# Patient Record
Sex: FEMALE | Race: WHITE | Employment: FULL TIME | URBAN - METROPOLITAN AREA
[De-identification: names, ages, dates, MRNs, and addresses within clinical notes are randomized per-mention and may not be internally consistent; named-entity substitution may affect disease eponyms.]

---

## 2017-05-12 ENCOUNTER — HOSPITAL ENCOUNTER (OUTPATIENT)
Dept: MAMMOGRAPHY | Age: 47
Discharge: HOME | End: 2017-05-12
Attending: OBSTETRICS & GYNECOLOGY | Admitting: OBSTETRICS & GYNECOLOGY

## 2017-05-12 DIAGNOSIS — Z12.31 ENCOUNTER FOR SCREENING MAMMOGRAM FOR BREAST CANCER: ICD-10-CM

## 2018-06-22 ENCOUNTER — HOSPITAL ENCOUNTER (OUTPATIENT)
Dept: MAMMOGRAPHY | Age: 48
Discharge: HOME | End: 2018-06-22
Attending: OBSTETRICS & GYNECOLOGY | Admitting: OBSTETRICS & GYNECOLOGY

## 2018-06-22 DIAGNOSIS — Z12.31 ENCOUNTER FOR SCREENING MAMMOGRAM FOR BREAST CANCER: ICD-10-CM

## 2019-05-17 ENCOUNTER — HOSPITAL ENCOUNTER (EMERGENCY)
Age: 49
Discharge: HOME | End: 2019-05-17
Attending: EMERGENCY MEDICINE
Payer: COMMERCIAL

## 2019-05-17 ENCOUNTER — APPOINTMENT (OUTPATIENT)
Dept: CT IMAGING | Age: 49
End: 2019-05-17
Payer: COMMERCIAL

## 2019-05-17 VITALS
TEMPERATURE: 98 F | HEIGHT: 66 IN | BODY MASS INDEX: 24.11 KG/M2 | HEART RATE: 63 BPM | RESPIRATION RATE: 16 BRPM | SYSTOLIC BLOOD PRESSURE: 106 MMHG | WEIGHT: 150 LBS | DIASTOLIC BLOOD PRESSURE: 72 MMHG | OXYGEN SATURATION: 99 %

## 2019-05-17 DIAGNOSIS — J32.4 CHRONIC PANSINUSITIS: Primary | ICD-10-CM

## 2019-05-17 PROCEDURE — 70486 CT MAXILLOFACIAL W/O DYE: CPT

## 2019-05-17 PROCEDURE — 99284 EMERGENCY DEPT VISIT MOD MDM: CPT

## 2019-05-17 PROCEDURE — 6370000000 HC RX 637 (ALT 250 FOR IP): Performed by: PHYSICIAN ASSISTANT

## 2019-05-17 PROCEDURE — 6360000002 HC RX W HCPCS: Performed by: PHYSICIAN ASSISTANT

## 2019-05-17 PROCEDURE — 96372 THER/PROPH/DIAG INJ SC/IM: CPT

## 2019-05-17 RX ORDER — GUAIFENESIN 600 MG/1
600 TABLET, EXTENDED RELEASE ORAL 2 TIMES DAILY
COMMUNITY

## 2019-05-17 RX ORDER — PSEUDOEPHEDRINE HCL 120 MG/1
120 TABLET, FILM COATED, EXTENDED RELEASE ORAL EVERY 12 HOURS
Qty: 20 TABLET | Refills: 0 | Status: SHIPPED | OUTPATIENT
Start: 2019-05-17 | End: 2019-05-27

## 2019-05-17 RX ORDER — CLINDAMYCIN HYDROCHLORIDE 150 MG/1
450 CAPSULE ORAL
COMMUNITY
Start: 2019-05-14 | End: 2019-05-17

## 2019-05-17 RX ORDER — PREDNISONE 10 MG/1
TABLET ORAL
Qty: 30 TABLET | Refills: 0 | Status: SHIPPED | OUTPATIENT
Start: 2019-05-17 | End: 2019-05-27

## 2019-05-17 RX ORDER — AZELASTINE HYDROCHLORIDE, FLUTICASONE PROPIONATE 137; 50 UG/1; UG/1
2 SPRAY, METERED NASAL DAILY
Qty: 1 BOTTLE | Refills: 0 | Status: SHIPPED | OUTPATIENT
Start: 2019-05-17

## 2019-05-17 RX ORDER — CETIRIZINE HYDROCHLORIDE 10 MG/1
10 TABLET ORAL DAILY
COMMUNITY

## 2019-05-17 RX ORDER — DIPHENHYDRAMINE HCL 25 MG
25 TABLET ORAL ONCE
Status: COMPLETED | OUTPATIENT
Start: 2019-05-17 | End: 2019-05-17

## 2019-05-17 RX ORDER — METHYLPREDNISOLONE SODIUM SUCCINATE 125 MG/2ML
125 INJECTION, POWDER, LYOPHILIZED, FOR SOLUTION INTRAMUSCULAR; INTRAVENOUS ONCE
Status: COMPLETED | OUTPATIENT
Start: 2019-05-17 | End: 2019-05-17

## 2019-05-17 RX ORDER — DOXYCYCLINE 100 MG/1
100 TABLET ORAL 2 TIMES DAILY
Qty: 28 TABLET | Refills: 0 | Status: SHIPPED | OUTPATIENT
Start: 2019-05-17 | End: 2019-05-31

## 2019-05-17 RX ORDER — NICOTINE 14MG/24HR
PATCH, TRANSDERMAL 24 HOURS TRANSDERMAL
COMMUNITY

## 2019-05-17 RX ADMIN — METHYLPREDNISOLONE SODIUM SUCCINATE 125 MG: 125 INJECTION, POWDER, FOR SOLUTION INTRAMUSCULAR; INTRAVENOUS at 18:51

## 2019-05-17 RX ADMIN — DIPHENHYDRAMINE HCL 25 MG: 25 TABLET ORAL at 18:48

## 2019-05-17 ASSESSMENT — PAIN SCALES - GENERAL: PAINLEVEL_OUTOF10: 5

## 2019-05-17 ASSESSMENT — PAIN DESCRIPTION - DESCRIPTORS: DESCRIPTORS: PRESSURE

## 2019-05-17 ASSESSMENT — PAIN DESCRIPTION - PAIN TYPE: TYPE: ACUTE PAIN

## 2019-05-17 ASSESSMENT — PAIN DESCRIPTION - LOCATION: LOCATION: HEAD

## 2019-05-17 NOTE — ED NOTES
Dr. Rinku Jane at bedside [facility], [address] [place] [address]  05/17/19 9771

## 2019-05-17 NOTE — ED PROVIDER NOTES
is to be supposed to begin treatment for that. She states that she would like to be admitted, she states that she isn't so much pain that she is much worse. Rates her pain level is 5/10. States nothing makes her feel better. She has done sinus rinses, has done Flonase for 6 days and she feels none of this helps. Nursing Notes were all reviewed and agreed with or any disagreements were addressed  in the HPI. REVIEW OF SYSTEMS    (2-9 systems for level 4, 10 or more for level 5)     Review of Systems    Positives and Pertinent negatives as per HPI. Except as noted abovein the ROS, all other systems were reviewed and negative. PAST MEDICAL HISTORY     Past Medical History:   Diagnosis Date    Sinus disorder     ENT Dr. Rubina Ram     Past Surgical History:   Procedure Laterality Date     SECTION      KNEE ARTHROSCOPY Left     x 2         CURRENTMEDICATIONS       Previous Medications    CETIRIZINE (ZYRTEC) 10 MG TABLET    Take 10 mg by mouth daily    GUAIFENESIN (MUCINEX) 600 MG EXTENDED RELEASE TABLET    Take 600 mg by mouth 2 times daily    SACCHAROMYCES BOULARDII (PROBIOTIC) 250 MG CAPS    Take by mouth    VITAMIN D (CHOLECALCIFEROL) 1000 UNIT TABS TABLET    Take 2,000 Units by mouth         ALLERGIES     Ciprofloxacin; Pcn [penicillins]; and Sulfamethoxazole-trimethoprim    FAMILYHISTORY     History reviewed. No pertinent family history.        SOCIAL HISTORY       Social History     Socioeconomic History    Marital status:      Spouse name: None    Number of children: None    Years of education: None    Highest education level: None   Occupational History    None   Social Needs    Financial resource strain: None    Food insecurity:     Worry: None     Inability: None    Transportation needs:     Medical: None     Non-medical: None   Tobacco Use    Smoking status: Never Smoker    Smokeless tobacco: Never Used   Substance and Sexual Activity    achievable. COMPARISON: None HISTORY: ORDERING SYSTEM PROVIDED HISTORY: sinus congestion TECHNOLOGIST PROVIDED HISTORY: Ordering Physician Provided Reason for Exam: sinus congestion x 6 wks, pt has been on 3 antibiotics Acuity: Acute Type of Exam: Initial Relevant Medical/Surgical History: sinus surgery 12 yrs ago FINDINGS: SINUSES/MASTOIDS:  Evidence of medial maxillary antrostomies and uncinectomies. Bilateral ethmoid alcides ease. Moderate mucosal thickening antrum of the right maxillary sinus. Mild mucosal thickening within the left maxillary sinus. Moderate severe mucosal thickening and involving the ethmoid air cells notably anteriorly on the right posteriorly on the right. CT mild mucosal thickening both frontal sinus drainage pathways. Frontal sinus otherwise clear. Mild mucosal thickening right sphenoid sinus. Moderate-sized air-fluid level left sphenoid sinus. SOFT TISSUES:  Visualized soft tissues demonstrate no acute abnormality. The visualized portion of the intracranial contents demonstrate no gross acute abnormality. Moderate sinus disease with air-fluid level left sphenoid sinus suggestive of acute bacterial sinusitis. PROCEDURES   Unless otherwise noted below, none     Procedures    CRITICAL CARE TIME   N/A    CONSULTS:  None      EMERGENCY DEPARTMENT COURSE and DIFFERENTIALDIAGNOSIS/MDM:   Vitals:    Vitals:    05/17/19 1446 05/17/19 1456 05/17/19 1630   BP:  123/71 128/70   Pulse: 69  70   Resp: 16  12   Temp: 97.9 °F (36.6 °C)  98 °F (36.7 °C)   TempSrc: Oral  Oral   SpO2: 100%  100%   Weight: 150 lb (68 kg)     Height: 5' 6\" (1.676 m)         Patient was given thefollowing medications:  Medications   methylPREDNISolone sodium (SOLU-MEDROL) injection 125 mg (125 mg Intramuscular Given 5/17/19 1851)   diphenhydrAMINE (BENADRYL) tablet 25 mg (25 mg Oral Given 5/17/19 1848)       This patient has chronic sinusitis, she has continued fluid in her sinuses as evidenced by CT scan. We will treat her with an extended course of prednisone, Sudafed, doxycycline and steroid nasal sprays. We'll encourage close follow-up, nothing to suggest the need for admission at this time      FINAL IMPRESSION      1.  Chronic pansinusitis          DISPOSITION/PLAN   DISPOSITION Decision To Discharge 05/17/2019 06:54:48 PM      PATIENT REFERREDTO:  see your ENT    In 3 days  For a recheck in 3-7 days      DISCHARGE MEDICATIONS:  New Prescriptions    AZELASTINE-FLUTICASONE (DYMISTA) 137-50 MCG/ACT SUSP    2 sprays by Nasal route daily Use for a minimum of 2-3 months    DOXYCYCLINE MONOHYDRATE (ADOXA) 100 MG TABLET    Take 1 tablet by mouth 2 times daily for 14 days May substitute any form of doxycycline per insurance needs    PREDNISONE (DELTASONE) 10 MG TABLET    5 tabs po qam for 2 days then 4,3,2,1 tabs qam for 2 days each total of 10 days    PSEUDOEPHEDRINE (SUDAFED 12 HOUR) 120 MG EXTENDED RELEASE TABLET    Take 1 tablet by mouth every 12 hours for 20 doses       DISCONTINUED MEDICATIONS:  Discontinued Medications    CLINDAMYCIN (CLEOCIN) 150 MG CAPSULE    Take 450 mg by mouth              (Please note that portions ofthis note were completed with a voice recognition program.  Efforts were made to edit the dictations but occasionally words are mis-transcribed.)    KEVIN Decker (electronically signed)           KEVIN Decker  05/17/19 3363

## 2019-05-26 NOTE — ED PROVIDER NOTES
Emergency Department Provider Note     Location: [address] [place] [address]  ED  5/17/2019    I independently performed a history and physical on [employer]. All diagnostic, treatment, and disposition decisions were made by myself in conjunction with the mid-level provider. Briefly, this is a 47 y.o. female here for sinus pressure and headache. Patient reports she has had sinusitis off and on for many years. Her current episode has been ongoing for months and not getting better despite 3 rounds of Augmentin and now on Clindamycin. 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 Earlier today, she also had the sensation that her throat was closing. Patient doesn't feel better with clindamycin and now wants to be admitted for IV antibiotics. ED Triage Vitals   BP Temp Temp Source Pulse Resp SpO2 Height Weight   05/17/19 1456 05/17/19 1446 05/17/19 1446 05/17/19 1446 05/17/19 1446 05/17/19 1446 05/17/19 1446 05/17/19 1446   123/71 97.9 °F (36.6 °C) Oral 69 16 100 % 5' 6\" (1.676 m) 150 lb (68 kg)        Exam showed WDWN female NAD, ACOx3, heart RRR, no murmur, lungs clear, no stridor, maxillary sinuses and frontal sinuses tender to palpation. No cervical LAD. No facial swelling, lip/tongue swelling. Posterior pharynx unremarkable. Patient seen and examined in room 27. She wanted a CT and admission to hospital.  PA asked me to be involved in her care. Radiology:  Ct Sinus Wo Contrast    Result Date: 5/17/2019  EXAMINATION: CT OF THE SINUS WITHOUT CONTRAST  5/17/2019 4:50 pm TECHNIQUE: CT of the sinuses was performed without the administration of intravenous contrast. Multiplanar reformatted images are provided for review. Dose modulation, iterative reconstruction, and/or weight based adjustment of the mA/kV was utilized to reduce the radiation dose to as low as reasonably achievable.  COMPARISON: None HISTORY: ORDERING SYSTEM PROVIDED HISTORY: sinus congestion TECHNOLOGIST PROVIDED HISTORY: Ordering Physician Provided Reason for Exam: sinus congestion x 6 wks, pt has been on 3 antibiotics Acuity: Acute Type of Exam: Initial Relevant Medical/Surgical History: sinus surgery 12 yrs ago FINDINGS: SINUSES/MASTOIDS:  Evidence of medial maxillary antrostomies and uncinectomies. Bilateral ethmoid alcides ease. Moderate mucosal thickening antrum of the right maxillary sinus. Mild mucosal thickening within the left maxillary sinus. Moderate severe mucosal thickening and involving the ethmoid air cells notably anteriorly on the right posteriorly on the right. CT mild mucosal thickening both frontal sinus drainage pathways. Frontal sinus otherwise clear. Mild mucosal thickening right sphenoid sinus. Moderate-sized air-fluid level left sphenoid sinus. SOFT TISSUES:  Visualized soft tissues demonstrate no acute abnormality. The visualized portion of the intracranial contents demonstrate no gross acute abnormality. Moderate sinus disease with air-fluid level left sphenoid sinus suggestive of acute bacterial sinusitis. I explained to the patient why I don't think IV abx or hospital admission is indicated. She was also really worried about her multiple allergies/sensitivities to food. We had a lengthy discussion as to how hospital admission is unlikely to change that and she needs long-term follow-up with immunologist (for IgG deficiency) and ENT (for her sinusitis). Patient otherwise has normal vital signs, clinically well appearing, and no evidence of airway issues. Also no concern for meningitis, mastoiditis, or sepsis. Reassurance, d/c home. For further details of [facility] emergency department encounter, please see KEVIN Cevallos's documentation. This chart was generated in part by using Dragon Dictation system and may contain errors related to that system including errors in grammar, punctuation, and spelling, as well as words and phrases that may be inappropriate. If there are any questions or concerns please feel free to contact the dictating provider for clarification.           Analilia Brown MD  05/26/19 0009

## 2019-07-12 ENCOUNTER — HOSPITAL ENCOUNTER (OUTPATIENT)
Dept: WOMENS IMAGING | Age: 49
Discharge: HOME | End: 2019-07-12
Payer: COMMERCIAL

## 2019-07-12 DIAGNOSIS — Z12.31 VISIT FOR SCREENING MAMMOGRAM: ICD-10-CM

## 2019-07-12 PROCEDURE — 77063 BREAST TOMOSYNTHESIS BI: CPT

## 2020-07-16 ENCOUNTER — HOSPITAL ENCOUNTER (OUTPATIENT)
Dept: WOMENS IMAGING | Age: 50
Discharge: HOME | End: 2020-07-16
Payer: COMMERCIAL

## 2020-07-16 PROCEDURE — 77063 BREAST TOMOSYNTHESIS BI: CPT

## 2021-08-11 ENCOUNTER — HOSPITAL ENCOUNTER (OUTPATIENT)
Dept: WOMENS IMAGING | Age: 51
Discharge: HOME | End: 2021-08-11
Payer: COMMERCIAL

## 2021-08-11 DIAGNOSIS — Z12.31 BREAST CANCER SCREENING BY MAMMOGRAM: ICD-10-CM

## 2021-08-11 PROCEDURE — 77063 BREAST TOMOSYNTHESIS BI: CPT

## 2022-04-13 ENCOUNTER — APPOINTMENT (OUTPATIENT)
Dept: CT IMAGING | Age: 52
End: 2022-04-13
Payer: COMMERCIAL

## 2022-04-13 ENCOUNTER — HOSPITAL ENCOUNTER (EMERGENCY)
Age: 52
Discharge: HOME | End: 2022-04-13
Attending: EMERGENCY MEDICINE
Payer: COMMERCIAL

## 2022-04-13 VITALS
SYSTOLIC BLOOD PRESSURE: 133 MMHG | RESPIRATION RATE: 20 BRPM | OXYGEN SATURATION: 100 % | TEMPERATURE: 98.4 F | BODY MASS INDEX: 22.82 KG/M2 | WEIGHT: 142 LBS | HEART RATE: 70 BPM | DIASTOLIC BLOOD PRESSURE: 70 MMHG | HEIGHT: 66 IN

## 2022-04-13 DIAGNOSIS — Q62.11 HYDRONEPHROSIS WITH URETEROPELVIC JUNCTION (UPJ) OBSTRUCTION: Primary | ICD-10-CM

## 2022-04-13 LAB
A/G RATIO: 2.4 (ref 1.1–2.2)
ALBUMIN SERPL-MCNC: 4.7 G/DL (ref 3.4–5)
ALP BLD-CCNC: 71 U/L (ref 40–129)
ALT SERPL-CCNC: 25 U/L (ref 10–40)
ANION GAP SERPL CALCULATED.3IONS-SCNC: 9 MMOL/L (ref 3–16)
AST SERPL-CCNC: 22 U/L (ref 15–37)
BASOPHILS ABSOLUTE: 0.1 K/UL (ref 0–0.2)
BASOPHILS RELATIVE PERCENT: 0.9 %
BILIRUB SERPL-MCNC: 0.5 MG/DL (ref 0–1)
BILIRUBIN URINE: ABNORMAL
BLOOD, URINE: ABNORMAL
BUN BLDV-MCNC: 17 MG/DL (ref 7–20)
CALCIUM SERPL-MCNC: 9.1 MG/DL (ref 8.3–10.6)
CHLORIDE BLD-SCNC: 103 MMOL/L (ref 99–110)
CLARITY: CLEAR
CO2: 28 MMOL/L (ref 21–32)
COLOR: ABNORMAL
CREAT SERPL-MCNC: 0.6 MG/DL (ref 0.6–1.1)
EOSINOPHILS ABSOLUTE: 0.1 K/UL (ref 0–0.6)
EOSINOPHILS RELATIVE PERCENT: 1.6 %
EPITHELIAL CELLS, UA: ABNORMAL /HPF (ref 0–5)
GFR AFRICAN AMERICAN: >60
GFR NON-AFRICAN AMERICAN: >60
GLUCOSE BLD-MCNC: 97 MG/DL (ref 70–99)
GLUCOSE URINE: ABNORMAL MG/DL
HCG(URINE) PREGNANCY TEST: NEGATIVE
HCT VFR BLD CALC: 40.8 % (ref 36–48)
HEMOGLOBIN: 13.7 G/DL (ref 12–16)
KETONES, URINE: ABNORMAL MG/DL
LEUKOCYTE ESTERASE, URINE: ABNORMAL
LYMPHOCYTES ABSOLUTE: 1.4 K/UL (ref 1–5.1)
LYMPHOCYTES RELATIVE PERCENT: 23.1 %
MCH RBC QN AUTO: 30.6 PG (ref 26–34)
MCHC RBC AUTO-ENTMCNC: 33.6 G/DL (ref 31–36)
MCV RBC AUTO: 91.1 FL (ref 80–100)
MICROSCOPIC EXAMINATION: YES
MONOCYTES ABSOLUTE: 0.7 K/UL (ref 0–1.3)
MONOCYTES RELATIVE PERCENT: 11 %
NEUTROPHILS ABSOLUTE: 3.8 K/UL (ref 1.7–7.7)
NEUTROPHILS RELATIVE PERCENT: 63.4 %
NITRITE, URINE: ABNORMAL
PDW BLD-RTO: 14.1 % (ref 12.4–15.4)
PH UA: ABNORMAL (ref 5–8)
PLATELET # BLD: 174 K/UL (ref 135–450)
PMV BLD AUTO: 7.6 FL (ref 5–10.5)
POTASSIUM REFLEX MAGNESIUM: 4 MMOL/L (ref 3.5–5.1)
PROTEIN UA: ABNORMAL MG/DL
RBC # BLD: 4.48 M/UL (ref 4–5.2)
RBC UA: ABNORMAL /HPF (ref 0–4)
SODIUM BLD-SCNC: 140 MMOL/L (ref 136–145)
SPECIFIC GRAVITY UA: ABNORMAL (ref 1–1.03)
TOTAL PROTEIN: 6.7 G/DL (ref 6.4–8.2)
URINE REFLEX TO CULTURE: ABNORMAL
URINE TYPE: ABNORMAL
UROBILINOGEN, URINE: ABNORMAL E.U./DL
WBC # BLD: 5.9 K/UL (ref 4–11)
WBC UA: ABNORMAL /HPF (ref 0–5)

## 2022-04-13 PROCEDURE — 85025 COMPLETE CBC W/AUTO DIFF WBC: CPT

## 2022-04-13 PROCEDURE — 80053 COMPREHEN METABOLIC PANEL: CPT

## 2022-04-13 PROCEDURE — 6360000002 HC RX W HCPCS: Performed by: EMERGENCY MEDICINE

## 2022-04-13 PROCEDURE — 84703 CHORIONIC GONADOTROPIN ASSAY: CPT

## 2022-04-13 PROCEDURE — 6370000000 HC RX 637 (ALT 250 FOR IP): Performed by: EMERGENCY MEDICINE

## 2022-04-13 PROCEDURE — 99285 EMERGENCY DEPT VISIT HI MDM: CPT

## 2022-04-13 PROCEDURE — 81001 URINALYSIS AUTO W/SCOPE: CPT

## 2022-04-13 PROCEDURE — 96374 THER/PROPH/DIAG INJ IV PUSH: CPT

## 2022-04-13 PROCEDURE — 74176 CT ABD & PELVIS W/O CONTRAST: CPT

## 2022-04-13 RX ORDER — DIAZEPAM 5 MG/1
5 TABLET ORAL ONCE
Status: DISCONTINUED | OUTPATIENT
Start: 2022-04-13 | End: 2022-04-13 | Stop reason: HOSPADM

## 2022-04-13 RX ORDER — KETOROLAC TROMETHAMINE 30 MG/ML
15 INJECTION, SOLUTION INTRAMUSCULAR; INTRAVENOUS ONCE
Status: COMPLETED | OUTPATIENT
Start: 2022-04-13 | End: 2022-04-13

## 2022-04-13 RX ORDER — HYDROCODONE BITARTRATE AND ACETAMINOPHEN 5; 325 MG/1; MG/1
1 TABLET ORAL ONCE
Status: COMPLETED | OUTPATIENT
Start: 2022-04-13 | End: 2022-04-13

## 2022-04-13 RX ORDER — HYDROCODONE BITARTRATE AND ACETAMINOPHEN 5; 325 MG/1; MG/1
1 TABLET ORAL EVERY 6 HOURS PRN
Qty: 12 TABLET | Refills: 0 | Status: SHIPPED | OUTPATIENT
Start: 2022-04-13 | End: 2022-04-16

## 2022-04-13 RX ADMIN — KETOROLAC TROMETHAMINE 15 MG: 30 INJECTION, SOLUTION INTRAMUSCULAR at 06:34

## 2022-04-13 RX ADMIN — HYDROCODONE BITARTRATE AND ACETAMINOPHEN 1 TABLET: 5; 325 TABLET ORAL at 10:27

## 2022-04-13 ASSESSMENT — PAIN SCALES - GENERAL
PAINLEVEL_OUTOF10: 7
PAINLEVEL_OUTOF10: 8

## 2022-04-13 ASSESSMENT — PAIN DESCRIPTION - LOCATION
LOCATION: FLANK
LOCATION: BACK;FLANK
LOCATION: FLANK
LOCATION: BACK

## 2022-04-13 ASSESSMENT — PAIN DESCRIPTION - ORIENTATION
ORIENTATION: LEFT
ORIENTATION: RIGHT
ORIENTATION: LEFT
ORIENTATION: LEFT

## 2022-04-13 ASSESSMENT — PAIN - FUNCTIONAL ASSESSMENT: PAIN_FUNCTIONAL_ASSESSMENT: 0-10

## 2022-04-13 ASSESSMENT — PAIN DESCRIPTION - PAIN TYPE: TYPE: ACUTE PAIN

## 2022-04-13 NOTE — ED PROVIDER NOTES
[address] [facility]  ED  EMERGENCY DEPARTMENT ENCOUNTER      Pt Name: David Fields  MRN: [id number]  Julianegfkendra 1970  Date of evaluation: 2022  Provider: Sugey Celis MD    CHIEF COMPLAINT       Chief Complaint   Patient presents with    Flank Pain     pt complaining of back pain, flank pain since last night, took pyridium, hx of bladder infections    Back Pain         HISTORY OF PRESENT ILLNESS   (Location/Symptom, Timing/Onset, Context/Setting, Quality, Duration, Modifying Factors, Severity)  Note limiting factors. David Fields is a 45 y.o. female with past medical history of no significant chronic problems here today with back pain. Patient states that she was up last night with a throbbing aching pain in her left back. She states \"I am not pregnant, but it feels like I am having back contractions\". She states the pain is nonradiating. No numbness, tingling or weakness in her extremities. No saddle anesthesia. No trauma or injury. She states she has a history of frequent UTIs but denies any dysuria or hematuria. She has never had a prior kidney stone. Denies nausea or vomiting. No associated fevers. Pain moderate to severe worsened with certain positions. HPI    Nursing Notes were reviewed. REVIEW OF SYSTEMS    (2-9 systems for level 4, 10 or more for level 5)     Review of Systems    Please see HPI for pertinent positive and negative review of system findings. A full 10 system ROS was performed and otherwise negative.         PAST MEDICAL HISTORY     Past Medical History:   Diagnosis Date    Sinus disorder     ENT Dr. Liss Paez       Past Surgical History:   Procedure Laterality Date     SECTION      KNEE ARTHROSCOPY Left     x 2         CURRENT MEDICATIONS       Previous Medications    AZELASTINE-FLUTICASONE (DYMISTA) 137-50 MCG/ACT SUSP    2 sprays by Nasal route daily Use for a minimum of 2-3 months    CETIRIZINE (ZYRTEC) 10 MG  Physically Abused: Not on file    Sexually Abused: Not on file   Housing Stability:     Unable to Pay for Housing in the Last Year: Not on file    Number of Places Lived in the Last Year: Not on file    Unstable Housing in the Last Year: Not on file       SCREENINGS    [place] Coma Scale  Eye Opening: Spontaneous  Best Verbal Response: Oriented  Best Motor Response: Obeys commands  [place] Coma Scale Score: 15          PHYSICAL EXAM    (up to 7 for level 4, 8 or more for level 5)     ED Triage Vitals [04/13/22 0603]   BP Temp Temp Source Pulse Resp SpO2 Height Weight   (!) 143/68 98.4 °F (36.9 °C) Oral 64 18 100 % 5' 6\" (1.676 m) 142 lb (64.4 kg)       Physical Exam    General appearance:  Cooperative. No acute distress. Skin:  Warm. Dry. Eye:  Extraocular movements intact. Ears, nose, mouth and throat:  Oral mucosa moist,  Neck:  Trachea midline. Heart:  Regular rate and rhythm  Perfusion:  intact  Respiratory:  Lungs clear to auscultation bilaterally. Respirations nonlabored. Abdominal:   Non distended. Nontender  Neurological:  Alert and oriented x 3. Moves all extremities spontaneously. Intact sensation throughout the entire bilateral lower extremities. Normal gait and strength in the bilateral lower extremities. Musculoskeletal:   Normal ROM, no deformities. No CVA tenderness. Tenderness to palpation of the paraspinal musculature in the left lower lumbosacral region.   Negative straight leg test and lower extremities          Psychiatric:  Normal mood      DIAGNOSTIC RESULTS       Labs Reviewed   URINALYSIS WITH REFLEX TO CULTURE - Abnormal; Notable for the following components:       Result Value    Color, UA ORANGE (*)     Glucose, Ur Color Interfer (*)     Bilirubin Urine Color Interfer (*)     Ketones, Urine Color Interfer (*)     Blood, Urine Color Interfer (*)     pH, UA Color Interfer (*)     Protein, UA Color Interfer (*)     Urobilinogen, Urine Color Interfer (*) Nitrite, Urine Color Interfer (*)     Leukocyte Esterase, Urine Color Interfer (*)     All other components within normal limits   COMPREHENSIVE METABOLIC PANEL W/ REFLEX TO MG FOR LOW K - Abnormal; Notable for the following components:    Albumin/Globulin Ratio 2.4 (*)     All other components within normal limits   MICROSCOPIC URINALYSIS - Abnormal; Notable for the following components:    RBC, UA 5-10 (*)     All other components within normal limits   PREGNANCY, URINE   CBC WITH AUTO DIFFERENTIAL       Interpretation per the Radiologist below, if obtained/available at the time of this note:    CT ABDOMEN PELVIS WO CONTRAST Additional Contrast? None   Final Result   1. Dilation of the left renal pelvis with transition at the left UPJ with no   evidence of renal or ureteral calculus. Differential considerations would   include extrarenal pelvis as a normal variant, UPJ obstruction or a recently   passed stone. 2. No other significant finding. RECOMMENDATIONS:   Unavailable             All other labs/imaging were within normal range or not returned as of this dictation. EMERGENCY DEPARTMENT COURSE and DIFFERENTIAL DIAGNOSIS/MDM:   Vitals:    Vitals:    04/13/22 0603 04/13/22 0715 04/13/22 0830 04/13/22 0930   BP: (!) 143/68 129/83 128/80 133/70   Pulse: 64 70 66 70   Resp: 18 20 20 20   Temp: 98.4 °F (36.9 °C)      TempSrc: Oral      SpO2: 100% 100% 100% 100%   Weight: 142 lb (64.4 kg)      Height: 5' 6\" (1.676 m)          Presented to the emergency department today with left-sided back pain. Atraumatic. No numbness, tingling or weakness in extremities. Tenderness in the left paraspinal musculature. Differential diagnosis included kidney stone, musculoskeletal etiology, pyelonephritis, retroperitoneal pathology. Laboratory studies performed were unremarkable aside from some hematuria noted.   This prompted a CT scan to evaluate for stone or renal pathology and she was ultimately found to have dilation of the renal pelvis with a differential diagnosis of recently passed stone, non-radiopaque stricture or other pathology. No signs of infection. Pain improved with treatment here. Will be discharged home with outpatient urology follow-up. MDM    CONSULTS     None    Critical Care:   None    REASSESSMENT          PROCEDURE     Unless otherwise noted below, none     Procedures      FINAL IMPRESSION      1. Hydronephrosis with ureteropelvic junction (UPJ) obstruction            DISPOSITION/PLAN   DISPOSITION Decision To Discharge 04/13/2022 10:05:20 AM        PATIENT REFERRED TO:  [facility] [address] [place] [address]  [phone]    Schedule an appointment as soon as possible for a visit         DISCHARGE MEDICATIONS:  New Prescriptions    HYDROCODONE-ACETAMINOPHEN (NORCO) 5-325 MG PER TABLET    Take 1 tablet by mouth every 6 hours as needed for Pain for up to 3 days. Intended supply: 3 days. Take lowest dose possible to manage pain     Controlled Substances Monitoring:     No flowsheet data found.     (Please note that portions of this note were completed with a voice recognition program.  Efforts were made to edit the dictations but occasionally words are mis-transcribed.)    Amita Hernandez MD (electronically signed)  Attending Emergency Physician            Bjorn Ford MD  04/13/22 4312

## 2022-07-26 ENCOUNTER — HOSPITAL ENCOUNTER (OUTPATIENT)
Dept: WOMENS IMAGING | Age: 52
Discharge: HOME | End: 2022-07-26
Payer: COMMERCIAL

## 2022-07-26 DIAGNOSIS — N64.4 MASTODYNIA: ICD-10-CM

## 2022-07-26 PROCEDURE — G0279 TOMOSYNTHESIS, MAMMO: HCPCS

## 2023-05-12 ENCOUNTER — HOSPITAL ENCOUNTER (OUTPATIENT)
Dept: WOMENS IMAGING | Age: 53
Discharge: HOME | End: 2023-05-12
Payer: COMMERCIAL

## 2023-05-12 VITALS — WEIGHT: 143 LBS | HEIGHT: 66 IN | BODY MASS INDEX: 22.98 KG/M2

## 2023-05-12 DIAGNOSIS — Z12.31 VISIT FOR SCREENING MAMMOGRAM: ICD-10-CM

## 2023-05-12 PROCEDURE — 77063 BREAST TOMOSYNTHESIS BI: CPT
